# Patient Record
Sex: MALE | Race: WHITE
[De-identification: names, ages, dates, MRNs, and addresses within clinical notes are randomized per-mention and may not be internally consistent; named-entity substitution may affect disease eponyms.]

---

## 2019-08-29 ENCOUNTER — HOSPITAL ENCOUNTER (EMERGENCY)
Dept: HOSPITAL 53 - M ED | Age: 84
Discharge: HOME | End: 2019-08-29
Payer: MEDICARE

## 2019-08-29 VITALS — WEIGHT: 199.96 LBS | BODY MASS INDEX: 27.08 KG/M2 | HEIGHT: 72 IN

## 2019-08-29 VITALS — SYSTOLIC BLOOD PRESSURE: 204 MMHG | DIASTOLIC BLOOD PRESSURE: 102 MMHG

## 2019-08-29 VITALS — SYSTOLIC BLOOD PRESSURE: 198 MMHG | DIASTOLIC BLOOD PRESSURE: 89 MMHG

## 2019-08-29 DIAGNOSIS — I44.4: ICD-10-CM

## 2019-08-29 DIAGNOSIS — Z88.0: ICD-10-CM

## 2019-08-29 DIAGNOSIS — I45.4: ICD-10-CM

## 2019-08-29 DIAGNOSIS — Z79.899: ICD-10-CM

## 2019-08-29 DIAGNOSIS — I16.0: Primary | ICD-10-CM

## 2019-08-29 LAB
APTT BLD: 30 SECONDS (ref 25–38.4)
BASOPHILS # BLD AUTO: 0.1 10^3/UL (ref 0–0.2)
BASOPHILS NFR BLD AUTO: 0.9 % (ref 0–1)
BUN SERPL-MCNC: 15 MG/DL (ref 7–18)
CALCIUM SERPL-MCNC: 9.4 MG/DL (ref 8.8–10.2)
CHLORIDE SERPL-SCNC: 105 MEQ/L (ref 98–107)
CK MB CFR.DF SERPL CALC: 2.63
CK MB SERPL-MCNC: 2 NG/ML (ref ?–3.6)
CK SERPL-CCNC: 76 U/L (ref 39–308)
CO2 SERPL-SCNC: 30 MEQ/L (ref 21–32)
CREAT SERPL-MCNC: 1.16 MG/DL (ref 0.7–1.3)
EOSINOPHIL # BLD AUTO: 0.3 10^3/UL (ref 0–0.5)
EOSINOPHIL NFR BLD AUTO: 3.6 % (ref 0–3)
ETHANOL SERPL-MCNC: < 0.003 % (ref 0–0.01)
GFR SERPL CREATININE-BSD FRML MDRD: > 60 ML/MIN/{1.73_M2} (ref 35–?)
GLUCOSE SERPL-MCNC: 104 MG/DL (ref 70–100)
HCT VFR BLD AUTO: 45.6 % (ref 42–52)
HGB BLD-MCNC: 15.6 G/DL (ref 13.5–17.5)
INR PPP: 0.97
LYMPHOCYTES # BLD AUTO: 1 10^3/UL (ref 1.5–4.5)
LYMPHOCYTES NFR BLD AUTO: 12.2 % (ref 24–44)
MAGNESIUM SERPL-MCNC: 2.2 MG/DL (ref 1.8–2.4)
MCH RBC QN AUTO: 32.6 PG (ref 27–33)
MCHC RBC AUTO-ENTMCNC: 34.2 G/DL (ref 32–36.5)
MCV RBC AUTO: 95.2 FL (ref 80–96)
MONOCYTES # BLD AUTO: 0.8 10^3/UL (ref 0–0.8)
MONOCYTES NFR BLD AUTO: 10.5 % (ref 0–5)
NEUTROPHILS # BLD AUTO: 5.8 10^3/UL (ref 1.8–7.7)
NEUTROPHILS NFR BLD AUTO: 72.2 % (ref 36–66)
NT-PRO BNP: 1298 PG/ML (ref ?–450)
PLATELET # BLD AUTO: 150 10^3/UL (ref 150–450)
POTASSIUM SERPL-SCNC: 4.2 MEQ/L (ref 3.5–5.1)
PROTHROMBIN TIME: 12.6 SECONDS (ref 11.8–14)
RBC # BLD AUTO: 4.79 10^6/UL (ref 4.3–6.1)
SODIUM SERPL-SCNC: 139 MEQ/L (ref 136–145)
T4 FREE SERPL-MCNC: 0.93 NG/DL (ref 0.76–1.46)
TROPONIN I SERPL-MCNC: 0.05 NG/ML (ref ?–0.1)
TSH SERPL DL<=0.005 MIU/L-ACNC: 2.35 UIU/ML (ref 0.36–3.74)
WBC # BLD AUTO: 8 10^3/UL (ref 4–10)

## 2019-08-29 PROCEDURE — 93041 RHYTHM ECG TRACING: CPT

## 2019-08-29 PROCEDURE — 83880 ASSAY OF NATRIURETIC PEPTIDE: CPT

## 2019-08-29 PROCEDURE — 85025 COMPLETE CBC W/AUTO DIFF WBC: CPT

## 2019-08-29 PROCEDURE — 96374 THER/PROPH/DIAG INJ IV PUSH: CPT

## 2019-08-29 PROCEDURE — 84484 ASSAY OF TROPONIN QUANT: CPT

## 2019-08-29 PROCEDURE — 80048 BASIC METABOLIC PNL TOTAL CA: CPT

## 2019-08-29 PROCEDURE — 83735 ASSAY OF MAGNESIUM: CPT

## 2019-08-29 PROCEDURE — 85610 PROTHROMBIN TIME: CPT

## 2019-08-29 PROCEDURE — 93005 ELECTROCARDIOGRAM TRACING: CPT

## 2019-08-29 PROCEDURE — 82553 CREATINE MB FRACTION: CPT

## 2019-08-29 PROCEDURE — 94760 N-INVAS EAR/PLS OXIMETRY 1: CPT

## 2019-08-29 PROCEDURE — 70450 CT HEAD/BRAIN W/O DYE: CPT

## 2019-08-29 PROCEDURE — 84443 ASSAY THYROID STIM HORMONE: CPT

## 2019-08-29 PROCEDURE — 85730 THROMBOPLASTIN TIME PARTIAL: CPT

## 2019-08-29 PROCEDURE — 99285 EMERGENCY DEPT VISIT HI MDM: CPT

## 2019-08-29 PROCEDURE — 81001 URINALYSIS AUTO W/SCOPE: CPT

## 2019-08-29 PROCEDURE — 84439 ASSAY OF FREE THYROXINE: CPT

## 2019-08-29 PROCEDURE — 71045 X-RAY EXAM CHEST 1 VIEW: CPT

## 2019-08-29 PROCEDURE — 82550 ASSAY OF CK (CPK): CPT

## 2019-08-29 NOTE — REP
Chest, single view

 

Indication:  Syncope/near syncope.

 

Comparison:  None

 

Findings:  The cardiomediastinal silhouette is normal in appearance for size.

There is tortuosity of the thoracic aorta.  The pulmonary vascularity is mildly

increased symmetrically.  There is no focal consolidation or suspicious pulmonary

nodule.  The costophrenic angles are sharp. Osseous structures are intact.

 

Impression:  Mild prominence of the pulmonary vasculature.  Heart size normal.

 

 

Electronically Signed by

Geraldine Young MD 08/29/2019 01:28 P

## 2019-08-29 NOTE — REP
CT of the head without contrast

 

Indication:  Syncope.

 

Comparison:  None

 

Technique:  Axial CT of the head was performed without contrast.

 

Findings:  There is no visible soft tissue swelling or calvarial fracture.  There

is no evidence of acute intracranial hemorrhage or extra-axial fluid collection.

Gray-white matter differentiation is maintained. There are dense intracranial

vascular calcifications. There is no mass effect or midline shift.  The basal

cisterns are patent. There is no hydrocephalus.  The visualized paranasal sinuses

and mastoid air cells are clear.

 

Impression:  No acute intracranial abnormality.

 

 

Electronically Signed by

Geraldine Young MD 08/29/2019 01:24 P

## 2019-08-30 NOTE — ECGEPIP
[facility] - ED

                                       

                                       Test Date:    2019

Pat Name:     JESSY CARSON         Department:   

Patient ID:   [id number]                 Room:         -

Gender:       Male                     Technician:   CJ

:          1935               Requested By: REJI MARTINEZ

Order Number: [id number]     Reading MD:   Mo Espinal

                                 Measurements

Intervals                              Axis          

Rate:         88                       P:            13

MI:           178                      QRS:          -67

QRSD:         139                      T:            23

QT:           421                                    

QTc:          511                                    

                           Interpretive Statements

SINUS RHYTHM WITH FREQUENT SUPRAVENTRICULAR PREMATURE COMPLEXES

INTRAVENTRICULAR CONDUCTION DELAY

LEFT ANTERIOR FASCICULAR BLOCK

MINIMAL VOLTAGE CRITERIA FOR LVH, CONSIDER NORMAL VARIANT

NO PRIORS FOR COMPARISON

Electronically Signed on 2019 8:13:02 EDT by Mo Espinal

## 2019-09-13 ENCOUNTER — HOSPITAL ENCOUNTER (OUTPATIENT)
Dept: HOSPITAL 53 - M SFHCPLAZ | Age: 84
End: 2019-09-13
Attending: FAMILY MEDICINE
Payer: MEDICARE

## 2019-09-13 DIAGNOSIS — Z13.220: Primary | ICD-10-CM

## 2019-09-13 DIAGNOSIS — I10: ICD-10-CM

## 2019-09-13 LAB
ALBUMIN SERPL BCG-MCNC: 3.9 GM/DL (ref 3.2–5.2)
ALT SERPL W P-5'-P-CCNC: 25 U/L (ref 12–78)
BILIRUB SERPL-MCNC: 1 MG/DL (ref 0.2–1)
BUN SERPL-MCNC: 15 MG/DL (ref 7–18)
CALCIUM SERPL-MCNC: 9.4 MG/DL (ref 8.8–10.2)
CHLORIDE SERPL-SCNC: 107 MEQ/L (ref 98–107)
CHOLEST SERPL-MCNC: 201 MG/DL (ref ?–200)
CHOLEST/HDLC SERPL: 3.35 {RATIO} (ref ?–5)
CO2 SERPL-SCNC: 27 MEQ/L (ref 21–32)
CREAT SERPL-MCNC: 1.04 MG/DL (ref 0.7–1.3)
CREAT UR-MCNC: 156 MG/DL
GFR SERPL CREATININE-BSD FRML MDRD: > 60 ML/MIN/{1.73_M2} (ref 35–?)
GLUCOSE SERPL-MCNC: 101 MG/DL (ref 70–100)
HDLC SERPL-MCNC: 60 MG/DL (ref 40–?)
LDLC SERPL CALC-MCNC: 104 MG/DL (ref ?–100)
MICROALBUMIN UR-MCNC: 51.9 MG/L
MICROALBUMIN/CREAT UR: 33.2 MCG/MG (ref 0–30)
NONHDLC SERPL-MCNC: 141 MG/DL
POTASSIUM SERPL-SCNC: 4.3 MEQ/L (ref 3.5–5.1)
PROT SERPL-MCNC: 6.7 GM/DL (ref 6.4–8.2)
SODIUM SERPL-SCNC: 141 MEQ/L (ref 136–145)
TRIGL SERPL-MCNC: 187 MG/DL (ref ?–150)

## 2019-10-10 ENCOUNTER — HOSPITAL ENCOUNTER (OUTPATIENT)
Dept: HOSPITAL 53 - M SFHCPLAZ | Age: 84
End: 2019-10-10
Attending: FAMILY MEDICINE
Payer: MEDICARE

## 2019-10-10 DIAGNOSIS — C44.622: Primary | ICD-10-CM

## 2019-10-10 DIAGNOSIS — I10: ICD-10-CM

## 2019-10-10 LAB
BUN SERPL-MCNC: 17 MG/DL (ref 7–18)
CALCIUM SERPL-MCNC: 8.9 MG/DL (ref 8.8–10.2)
CHLORIDE SERPL-SCNC: 105 MEQ/L (ref 98–107)
CO2 SERPL-SCNC: 30 MEQ/L (ref 21–32)
CREAT SERPL-MCNC: 1.06 MG/DL (ref 0.7–1.3)
CREAT UR-MCNC: 154 MG/DL
GFR SERPL CREATININE-BSD FRML MDRD: > 60 ML/MIN/{1.73_M2} (ref 35–?)
GLUCOSE SERPL-MCNC: 103 MG/DL (ref 70–100)
MICROALBUMIN UR-MCNC: 74.9 MG/L
MICROALBUMIN/CREAT UR: 48.6 MCG/MG (ref 0–30)
POTASSIUM SERPL-SCNC: 4.2 MEQ/L (ref 3.5–5.1)
SODIUM SERPL-SCNC: 142 MEQ/L (ref 136–145)

## 2019-10-10 PROCEDURE — 88305 TISSUE EXAM BY PATHOLOGIST: CPT

## 2019-10-10 PROCEDURE — 82043 UR ALBUMIN QUANTITATIVE: CPT

## 2019-10-10 PROCEDURE — 80048 BASIC METABOLIC PNL TOTAL CA: CPT

## 2019-10-10 PROCEDURE — 36415 COLL VENOUS BLD VENIPUNCTURE: CPT

## 2019-11-27 ENCOUNTER — HOSPITAL ENCOUNTER (OUTPATIENT)
Dept: HOSPITAL 53 - M LAB REF | Age: 84
End: 2019-11-27
Attending: DERMATOLOGY
Payer: MEDICARE

## 2019-11-27 DIAGNOSIS — C44.622: Primary | ICD-10-CM

## 2020-02-10 ENCOUNTER — HOSPITAL ENCOUNTER (OUTPATIENT)
Dept: HOSPITAL 53 - M LAB REF | Age: 85
End: 2020-02-10
Attending: DERMATOLOGY
Payer: MEDICARE

## 2020-02-10 DIAGNOSIS — L82.1: Primary | ICD-10-CM

## 2020-06-26 ENCOUNTER — HOSPITAL ENCOUNTER (OUTPATIENT)
Dept: HOSPITAL 53 - M PLALAB | Age: 85
End: 2020-06-26
Attending: FAMILY MEDICINE
Payer: MEDICARE

## 2020-06-26 DIAGNOSIS — I10: Primary | ICD-10-CM

## 2020-06-26 LAB
BUN SERPL-MCNC: 15 MG/DL (ref 7–18)
CALCIUM SERPL-MCNC: 9.2 MG/DL (ref 8.8–10.2)
CHLORIDE SERPL-SCNC: 106 MEQ/L (ref 98–107)
CO2 SERPL-SCNC: 30 MEQ/L (ref 21–32)
CREAT SERPL-MCNC: 1.21 MG/DL (ref 0.7–1.3)
GFR SERPL CREATININE-BSD FRML MDRD: > 60 ML/MIN/{1.73_M2} (ref 35–?)
GLUCOSE SERPL-MCNC: 102 MG/DL (ref 70–100)
POTASSIUM SERPL-SCNC: 4.7 MEQ/L (ref 3.5–5.1)
SODIUM SERPL-SCNC: 141 MEQ/L (ref 136–145)

## 2020-08-03 ENCOUNTER — HOSPITAL ENCOUNTER (OUTPATIENT)
Dept: HOSPITAL 53 - M SFHCPLAZ | Age: 85
End: 2020-08-03
Attending: FAMILY MEDICINE
Payer: MEDICARE

## 2020-08-03 DIAGNOSIS — I10: Primary | ICD-10-CM

## 2020-08-05 ENCOUNTER — HOSPITAL ENCOUNTER (OUTPATIENT)
Dept: HOSPITAL 53 - M SFHCPLAZ | Age: 85
End: 2020-08-05
Attending: FAMILY MEDICINE
Payer: MEDICARE

## 2020-08-05 DIAGNOSIS — I10: Primary | ICD-10-CM

## 2020-08-27 ENCOUNTER — HOSPITAL ENCOUNTER (OUTPATIENT)
Dept: HOSPITAL 53 - M PLALAB | Age: 85
End: 2020-08-27
Attending: FAMILY MEDICINE
Payer: MEDICARE

## 2020-08-27 DIAGNOSIS — I10: Primary | ICD-10-CM

## 2020-08-27 LAB
BUN SERPL-MCNC: 19 MG/DL (ref 7–18)
CALCIUM SERPL-MCNC: 9.4 MG/DL (ref 8.8–10.2)
CHLORIDE SERPL-SCNC: 107 MEQ/L (ref 98–107)
CO2 SERPL-SCNC: 31 MEQ/L (ref 21–32)
CREAT SERPL-MCNC: 1.29 MG/DL (ref 0.7–1.3)
GFR SERPL CREATININE-BSD FRML MDRD: 56.5 ML/MIN/{1.73_M2} (ref 35–?)
GLUCOSE SERPL-MCNC: 118 MG/DL (ref 70–100)
POTASSIUM SERPL-SCNC: 4.3 MEQ/L (ref 3.5–5.1)
SODIUM SERPL-SCNC: 143 MEQ/L (ref 136–145)

## 2020-09-12 LAB
ALBUMIN SERPL BCG-MCNC: 4 GM/DL (ref 3.2–5.2)
BUN SERPL-MCNC: 29 MG/DL (ref 7–18)
CALCIUM SERPL-MCNC: 9.1 MG/DL (ref 8.8–10.2)
CHLORIDE SERPL-SCNC: 106 MEQ/L (ref 98–107)
CO2 SERPL-SCNC: 30 MEQ/L (ref 21–32)
CREAT SERPL-MCNC: 1.62 MG/DL (ref 0.7–1.3)
GFR SERPL CREATININE-BSD FRML MDRD: 43.4 ML/MIN/{1.73_M2} (ref 35–?)
GLUCOSE SERPL-MCNC: 118 MG/DL (ref 70–100)
PHOSPHATE SERPL-MCNC: 3.2 MG/DL (ref 2.5–4.9)
POTASSIUM SERPL-SCNC: 4.2 MEQ/L (ref 3.5–5.1)
SODIUM SERPL-SCNC: 142 MEQ/L (ref 136–145)

## 2020-09-19 LAB
BUN SERPL-MCNC: 22 MG/DL (ref 7–18)
CALCIUM SERPL-MCNC: 8.7 MG/DL (ref 8.8–10.2)
CHLORIDE SERPL-SCNC: 105 MEQ/L (ref 98–107)
CO2 SERPL-SCNC: 33 MEQ/L (ref 21–32)
CREAT SERPL-MCNC: 1.44 MG/DL (ref 0.7–1.3)
GFR SERPL CREATININE-BSD FRML MDRD: 49.8 ML/MIN/{1.73_M2} (ref 35–?)
GLUCOSE SERPL-MCNC: 120 MG/DL (ref 70–100)
POTASSIUM SERPL-SCNC: 4.7 MEQ/L (ref 3.5–5.1)
SODIUM SERPL-SCNC: 142 MEQ/L (ref 136–145)

## 2021-02-02 ENCOUNTER — HOSPITAL ENCOUNTER (OUTPATIENT)
Dept: HOSPITAL 53 - M PLALAB | Age: 86
End: 2021-02-02
Attending: FAMILY MEDICINE
Payer: MEDICARE

## 2021-02-02 DIAGNOSIS — I10: Primary | ICD-10-CM

## 2021-02-02 LAB
BUN SERPL-MCNC: 24 MG/DL (ref 7–18)
CALCIUM SERPL-MCNC: 9.6 MG/DL (ref 8.8–10.2)
CHLORIDE SERPL-SCNC: 105 MEQ/L (ref 98–107)
CO2 SERPL-SCNC: 29 MEQ/L (ref 21–32)
CREAT SERPL-MCNC: 1.39 MG/DL (ref 0.7–1.3)
GFR SERPL CREATININE-BSD FRML MDRD: 51.8 ML/MIN/{1.73_M2} (ref 35–?)
GLUCOSE SERPL-MCNC: 118 MG/DL (ref 70–100)
POTASSIUM SERPL-SCNC: 4.3 MEQ/L (ref 3.5–5.1)
SODIUM SERPL-SCNC: 141 MEQ/L (ref 136–145)

## 2021-07-22 ENCOUNTER — HOSPITAL ENCOUNTER (OUTPATIENT)
Dept: HOSPITAL 53 - M PLALAB | Age: 86
End: 2021-07-22
Attending: FAMILY MEDICINE
Payer: MEDICARE

## 2021-07-22 DIAGNOSIS — N18.31: ICD-10-CM

## 2021-07-22 DIAGNOSIS — R73.01: Primary | ICD-10-CM

## 2021-07-22 LAB
BUN SERPL-MCNC: 16 MG/DL (ref 7–18)
CALCIUM SERPL-MCNC: 8.9 MG/DL (ref 8.8–10.2)
CHLORIDE SERPL-SCNC: 106 MEQ/L (ref 98–107)
CO2 SERPL-SCNC: 31 MEQ/L (ref 21–32)
CREAT SERPL-MCNC: 1.16 MG/DL (ref 0.7–1.3)
EST. AVERAGE GLUCOSE BLD GHB EST-MCNC: 105 MG/DL (ref 60–110)
GFR SERPL CREATININE-BSD FRML MDRD: > 60 ML/MIN/{1.73_M2} (ref 35–?)
GLUCOSE SERPL-MCNC: 85 MG/DL (ref 70–100)
HCT VFR BLD AUTO: 45.1 % (ref 42–52)
HGB BLD-MCNC: 15.1 G/DL (ref 13.5–17.5)
MCH RBC QN AUTO: 31.5 PG (ref 27–33)
MCHC RBC AUTO-ENTMCNC: 33.5 G/DL (ref 32–36.5)
MCV RBC AUTO: 94 FL (ref 80–96)
PLATELET # BLD AUTO: 160 10^3/UL (ref 150–450)
POTASSIUM SERPL-SCNC: 4.2 MEQ/L (ref 3.5–5.1)
RBC # BLD AUTO: 4.8 10^6/UL (ref 4.3–6.1)
SODIUM SERPL-SCNC: 141 MEQ/L (ref 136–145)
WBC # BLD AUTO: 7.7 10^3/UL (ref 4–10)

## 2022-05-06 ENCOUNTER — HOSPITAL ENCOUNTER (OUTPATIENT)
Dept: HOSPITAL 53 - M PLALAB | Age: 87
End: 2022-05-06
Attending: FAMILY MEDICINE
Payer: MEDICARE

## 2022-05-06 DIAGNOSIS — R23.3: Primary | ICD-10-CM

## 2022-05-06 DIAGNOSIS — K80.50: ICD-10-CM

## 2022-05-06 DIAGNOSIS — I50.42: ICD-10-CM

## 2022-05-06 DIAGNOSIS — I11.0: ICD-10-CM

## 2022-05-06 LAB
ALBUMIN SERPL BCG-MCNC: 2.7 GM/DL (ref 3.2–5.2)
ALT SERPL W P-5'-P-CCNC: 73 U/L (ref 12–78)
BILIRUB SERPL-MCNC: 3.7 MG/DL (ref 0.2–1)
BUN SERPL-MCNC: 20 MG/DL (ref 7–18)
CALCIUM SERPL-MCNC: 9.1 MG/DL (ref 8.8–10.2)
CHLORIDE SERPL-SCNC: 105 MEQ/L (ref 98–107)
CO2 SERPL-SCNC: 31 MEQ/L (ref 21–32)
CREAT SERPL-MCNC: 1 MG/DL (ref 0.7–1.3)
GFR SERPL CREATININE-BSD FRML MDRD: > 60 ML/MIN/{1.73_M2} (ref 35–?)
GLUCOSE SERPL-MCNC: 113 MG/DL (ref 70–100)
HCT VFR BLD AUTO: 34.5 % (ref 42–52)
HGB BLD-MCNC: 11.4 G/DL (ref 13.5–17.5)
MCH RBC QN AUTO: 31.3 PG (ref 27–33)
MCHC RBC AUTO-ENTMCNC: 33 G/DL (ref 32–36.5)
MCV RBC AUTO: 94.8 FL (ref 80–96)
NT-PRO BNP: 4100 PG/ML (ref ?–450)
PLATELET # BLD AUTO: 240 10^3/UL (ref 150–450)
POTASSIUM SERPL-SCNC: 4.5 MEQ/L (ref 3.5–5.1)
PROT SERPL-MCNC: 6.1 GM/DL (ref 6.4–8.2)
RBC # BLD AUTO: 3.64 10^6/UL (ref 4.3–6.1)
SODIUM SERPL-SCNC: 141 MEQ/L (ref 136–145)
WBC # BLD AUTO: 7.3 10^3/UL (ref 4–10)

## 2022-06-29 ENCOUNTER — HOSPITAL ENCOUNTER (INPATIENT)
Dept: HOSPITAL 53 - M ED | Age: 87
LOS: 6 days | DRG: 283 | End: 2022-07-05
Attending: FAMILY MEDICINE | Admitting: FAMILY MEDICINE
Payer: MEDICARE

## 2022-06-29 VITALS — HEIGHT: 72 IN | WEIGHT: 166.89 LBS | BODY MASS INDEX: 22.6 KG/M2

## 2022-06-29 DIAGNOSIS — I13.2: ICD-10-CM

## 2022-06-29 DIAGNOSIS — Z95.5: ICD-10-CM

## 2022-06-29 DIAGNOSIS — Z90.49: ICD-10-CM

## 2022-06-29 DIAGNOSIS — Z66: ICD-10-CM

## 2022-06-29 DIAGNOSIS — R74.01: ICD-10-CM

## 2022-06-29 DIAGNOSIS — I95.9: ICD-10-CM

## 2022-06-29 DIAGNOSIS — B96.20: ICD-10-CM

## 2022-06-29 DIAGNOSIS — D72.829: ICD-10-CM

## 2022-06-29 DIAGNOSIS — B95.2: ICD-10-CM

## 2022-06-29 DIAGNOSIS — E87.2: ICD-10-CM

## 2022-06-29 DIAGNOSIS — Z98.41: ICD-10-CM

## 2022-06-29 DIAGNOSIS — Z87.891: ICD-10-CM

## 2022-06-29 DIAGNOSIS — I48.91: ICD-10-CM

## 2022-06-29 DIAGNOSIS — I50.43: ICD-10-CM

## 2022-06-29 DIAGNOSIS — N18.6: ICD-10-CM

## 2022-06-29 DIAGNOSIS — D69.6: ICD-10-CM

## 2022-06-29 DIAGNOSIS — N17.9: ICD-10-CM

## 2022-06-29 DIAGNOSIS — I21.4: ICD-10-CM

## 2022-06-29 DIAGNOSIS — Z96.0: ICD-10-CM

## 2022-06-29 DIAGNOSIS — T82.7XXA: Primary | ICD-10-CM

## 2022-06-29 DIAGNOSIS — Z88.0: ICD-10-CM

## 2022-06-29 DIAGNOSIS — R00.0: ICD-10-CM

## 2022-06-29 DIAGNOSIS — Z79.899: ICD-10-CM

## 2022-06-29 DIAGNOSIS — E11.22: ICD-10-CM

## 2022-06-29 DIAGNOSIS — Z98.42: ICD-10-CM

## 2022-06-29 DIAGNOSIS — R65.20: ICD-10-CM

## 2022-06-29 DIAGNOSIS — A41.9: ICD-10-CM

## 2022-06-29 DIAGNOSIS — Z20.822: ICD-10-CM

## 2022-06-29 DIAGNOSIS — I27.20: ICD-10-CM

## 2022-06-29 DIAGNOSIS — N39.0: ICD-10-CM

## 2022-06-29 DIAGNOSIS — D63.1: ICD-10-CM

## 2022-06-29 DIAGNOSIS — Z99.2: ICD-10-CM

## 2022-06-29 LAB
BASOPHILS # BLD AUTO: 0.1 10^3/UL (ref 0–0.2)
BASOPHILS NFR BLD AUTO: 0.3 % (ref 0–1)
BUN SERPL-MCNC: 29 MG/DL (ref 7–18)
CALCIUM SERPL-MCNC: 8.1 MG/DL (ref 8.8–10.2)
CHLORIDE SERPL-SCNC: 104 MEQ/L (ref 98–107)
CK MB CFR.DF SERPL CALC: 11.2
CK MB CFR.DF SERPL CALC: 15.65
CK MB SERPL-MCNC: 5.6 NG/ML (ref ?–3.6)
CK MB SERPL-MCNC: 7.2 NG/ML (ref ?–3.6)
CK SERPL-CCNC: 46 U/L (ref 39–308)
CK SERPL-CCNC: 50 U/L (ref 39–308)
CO2 SERPL-SCNC: 26 MEQ/L (ref 21–32)
CREAT SERPL-MCNC: 3.86 MG/DL (ref 0.7–1.3)
EOSINOPHIL # BLD AUTO: 0 10^3/UL (ref 0–0.5)
EOSINOPHIL NFR BLD AUTO: 0.1 % (ref 0–3)
GFR SERPL CREATININE-BSD FRML MDRD: 15.9 ML/MIN/{1.73_M2} (ref 35–?)
GLUCOSE SERPL-MCNC: 78 MG/DL (ref 70–100)
HCT VFR BLD AUTO: 32.5 % (ref 42–52)
HGB BLD-MCNC: 10.9 G/DL (ref 13.5–17.5)
INR PPP: 1.1
LYMPHOCYTES # BLD AUTO: 0.6 10^3/UL (ref 1.5–5)
LYMPHOCYTES NFR BLD AUTO: 2.2 % (ref 24–44)
MAGNESIUM SERPL-MCNC: 2.1 MG/DL (ref 1.8–2.4)
MCH RBC QN AUTO: 32.7 PG (ref 27–33)
MCHC RBC AUTO-ENTMCNC: 33.5 G/DL (ref 32–36.5)
MCV RBC AUTO: 97.6 FL (ref 80–96)
MONOCYTES # BLD AUTO: 0.5 10^3/UL (ref 0–0.8)
MONOCYTES NFR BLD AUTO: 1.7 % (ref 2–8)
NEUTROPHILS # BLD AUTO: 26.9 10^3/UL (ref 1.5–8.5)
NEUTROPHILS NFR BLD AUTO: 94 % (ref 36–66)
PLATELET # BLD AUTO: 27 10^3/UL (ref 150–450)
POTASSIUM SERPL-SCNC: 4 MEQ/L (ref 3.5–5.1)
PROTHROMBIN TIME: 14.6 SECONDS (ref 12.7–14.5)
RBC # BLD AUTO: 3.33 10^6/UL (ref 4.3–6.1)
RSV RNA NPH QL NAA+PROBE: NEGATIVE
SODIUM SERPL-SCNC: 141 MEQ/L (ref 136–145)
T4 FREE SERPL-MCNC: 1.09 NG/DL (ref 0.76–1.46)
TSH SERPL DL<=0.005 MIU/L-ACNC: 4.81 UIU/ML (ref 0.36–3.74)
WBC # BLD AUTO: 28.6 10^3/UL (ref 4–10)

## 2022-06-30 VITALS — DIASTOLIC BLOOD PRESSURE: 58 MMHG | SYSTOLIC BLOOD PRESSURE: 92 MMHG

## 2022-06-30 VITALS — DIASTOLIC BLOOD PRESSURE: 58 MMHG | SYSTOLIC BLOOD PRESSURE: 105 MMHG

## 2022-06-30 VITALS — SYSTOLIC BLOOD PRESSURE: 91 MMHG | DIASTOLIC BLOOD PRESSURE: 57 MMHG

## 2022-06-30 VITALS — SYSTOLIC BLOOD PRESSURE: 100 MMHG | DIASTOLIC BLOOD PRESSURE: 60 MMHG

## 2022-06-30 VITALS — SYSTOLIC BLOOD PRESSURE: 92 MMHG | DIASTOLIC BLOOD PRESSURE: 52 MMHG

## 2022-06-30 VITALS — DIASTOLIC BLOOD PRESSURE: 60 MMHG | SYSTOLIC BLOOD PRESSURE: 100 MMHG

## 2022-06-30 LAB
ALBUMIN SERPL BCG-MCNC: 2 GM/DL (ref 3.2–5.2)
ALT SERPL W P-5'-P-CCNC: 46 U/L (ref 12–78)
AMYLASE SERPL-CCNC: 80 U/L (ref 25–115)
APTT BLD: 28.6 SECONDS (ref 25.9–37)
BACTERIA URNS QL MICRO: (no result)
BASOPHILS # BLD AUTO: 0.1 10^3/UL (ref 0–0.2)
BASOPHILS NFR BLD AUTO: 0.2 % (ref 0–1)
BILIRUB SERPL-MCNC: 2.3 MG/DL (ref 0.2–1)
BILIRUB UR QL STRIP: NEGATIVE
BUN SERPL-MCNC: 33 MG/DL (ref 7–18)
CALCIUM SERPL-MCNC: 7.7 MG/DL (ref 8.8–10.2)
CHLORIDE SERPL-SCNC: 105 MEQ/L (ref 98–107)
CK MB CFR.DF SERPL CALC: 13.27
CK MB CFR.DF SERPL CALC: 14.73
CK MB CFR.DF SERPL CALC: 18.84
CK MB CFR.DF SERPL CALC: 21.56
CK MB SERPL-MCNC: 13 NG/ML (ref ?–3.6)
CK MB SERPL-MCNC: 16.6 NG/ML (ref ?–3.6)
CK MB SERPL-MCNC: 6.5 NG/ML (ref ?–3.6)
CK MB SERPL-MCNC: 8.1 NG/ML (ref ?–3.6)
CK SERPL-CCNC: 49 U/L (ref 39–308)
CK SERPL-CCNC: 55 U/L (ref 39–308)
CK SERPL-CCNC: 69 U/L (ref 39–308)
CK SERPL-CCNC: 77 U/L (ref 39–308)
CO2 SERPL-SCNC: 25 MEQ/L (ref 21–32)
CREAT SERPL-MCNC: 4.27 MG/DL (ref 0.7–1.3)
EOSINOPHIL # BLD AUTO: 0 10^3/UL (ref 0–0.5)
EOSINOPHIL NFR BLD AUTO: 0 % (ref 0–3)
GFR SERPL CREATININE-BSD FRML MDRD: 14.1 ML/MIN/{1.73_M2} (ref 35–?)
GLUCOSE SERPL-MCNC: 85 MG/DL (ref 70–100)
GLUCOSE UR STRIP-MCNC: NEGATIVE MG/DL
HCT VFR BLD AUTO: 28.9 % (ref 42–52)
HGB BLD-MCNC: 9.5 G/DL (ref 13.5–17.5)
HYALINE CASTS URNS QL MICRO: (no result) /LPF (ref 0–1)
INR PPP: 1.15
KETONES UR QL STRIP: NEGATIVE MG/DL
LIPASE SERPL-CCNC: 158 U/L (ref 73–393)
LYMPHOCYTES # BLD AUTO: 0.6 10^3/UL (ref 1.5–5)
LYMPHOCYTES NFR BLD AUTO: 2.2 % (ref 24–44)
MAGNESIUM SERPL-MCNC: 2 MG/DL (ref 1.8–2.4)
MCH RBC QN AUTO: 32.2 PG (ref 27–33)
MCHC RBC AUTO-ENTMCNC: 32.9 G/DL (ref 32–36.5)
MCV RBC AUTO: 98 FL (ref 80–96)
MONOCYTES # BLD AUTO: 0.5 10^3/UL (ref 0–0.8)
MONOCYTES NFR BLD AUTO: 1.8 % (ref 2–8)
MUCOUS THREADS URNS QL MICRO: (no result)
NEUTROPHILS # BLD AUTO: 24.2 10^3/UL (ref 1.5–8.5)
NEUTROPHILS NFR BLD AUTO: 94.3 % (ref 36–66)
PHOSPHATE SERPL-MCNC: 4 MG/DL (ref 2.5–4.9)
PLATELET # BLD AUTO: 27 10^3/UL (ref 150–450)
POTASSIUM SERPL-SCNC: 4.4 MEQ/L (ref 3.5–5.1)
PROT SERPL-MCNC: 4.7 GM/DL (ref 6.4–8.2)
PROTHROMBIN TIME: 15.1 SECONDS (ref 12.7–14.5)
RBC # BLD AUTO: 2.95 10^6/UL (ref 4.3–6.1)
RBC #/AREA URNS HPF: (no result) /HPF (ref 0–3)
SODIUM SERPL-SCNC: 141 MEQ/L (ref 136–145)
SQUAMOUS URNS QL MICRO: (no result) /HPF
UROBILINOGEN UR QL STRIP: NORMAL MG/DL
WBC # BLD AUTO: 25.6 10^3/UL (ref 4–10)

## 2022-06-30 RX ADMIN — SEVELAMER CARBONATE SCH MG: 800 TABLET, FILM COATED ORAL at 18:00

## 2022-06-30 RX ADMIN — CEFTRIAXONE SCH MLS/HR: 1 INJECTION, POWDER, FOR SOLUTION INTRAMUSCULAR; INTRAVENOUS at 02:00

## 2022-06-30 RX ADMIN — DIGOXIN SCH MG: 0.25 INJECTION INTRAMUSCULAR; INTRAVENOUS at 12:21

## 2022-06-30 RX ADMIN — MULTIPLE VITAMINS W/ MINERALS TAB SCH TAB: TAB at 08:58

## 2022-06-30 RX ADMIN — PANTOPRAZOLE SODIUM SCH MG: 20 TABLET, DELAYED RELEASE ORAL at 09:00

## 2022-06-30 RX ADMIN — SEVELAMER CARBONATE SCH MG: 800 TABLET, FILM COATED ORAL at 08:58

## 2022-06-30 RX ADMIN — DIGOXIN SCH MG: 0.25 INJECTION INTRAMUSCULAR; INTRAVENOUS at 04:32

## 2022-06-30 RX ADMIN — OMEGA-3 FATTY ACIDS CAP 1000 MG SCH CAP: 1000 CAP at 08:58

## 2022-06-30 RX ADMIN — DIGOXIN SCH MG: 0.25 INJECTION INTRAMUSCULAR; INTRAVENOUS at 08:58

## 2022-06-30 RX ADMIN — SEVELAMER CARBONATE SCH MG: 800 TABLET, FILM COATED ORAL at 12:21

## 2022-07-01 VITALS — SYSTOLIC BLOOD PRESSURE: 119 MMHG | DIASTOLIC BLOOD PRESSURE: 65 MMHG

## 2022-07-01 VITALS — DIASTOLIC BLOOD PRESSURE: 61 MMHG | SYSTOLIC BLOOD PRESSURE: 99 MMHG

## 2022-07-01 VITALS — SYSTOLIC BLOOD PRESSURE: 99 MMHG | DIASTOLIC BLOOD PRESSURE: 61 MMHG

## 2022-07-01 VITALS — SYSTOLIC BLOOD PRESSURE: 116 MMHG | DIASTOLIC BLOOD PRESSURE: 66 MMHG

## 2022-07-01 VITALS — DIASTOLIC BLOOD PRESSURE: 66 MMHG | SYSTOLIC BLOOD PRESSURE: 112 MMHG

## 2022-07-01 VITALS — DIASTOLIC BLOOD PRESSURE: 67 MMHG | SYSTOLIC BLOOD PRESSURE: 115 MMHG

## 2022-07-01 VITALS — DIASTOLIC BLOOD PRESSURE: 72 MMHG | SYSTOLIC BLOOD PRESSURE: 118 MMHG

## 2022-07-01 LAB
ALBUMIN SERPL BCG-MCNC: 1.9 GM/DL (ref 3.2–5.2)
ALT SERPL W P-5'-P-CCNC: 39 U/L (ref 12–78)
BASOPHILS # BLD AUTO: 0 10^3/UL (ref 0–0.2)
BASOPHILS NFR BLD AUTO: 0.2 % (ref 0–1)
BILIRUB SERPL-MCNC: 1.3 MG/DL (ref 0.2–1)
BUN SERPL-MCNC: 43 MG/DL (ref 7–18)
CALCIUM SERPL-MCNC: 7.9 MG/DL (ref 8.8–10.2)
CHLORIDE SERPL-SCNC: 107 MEQ/L (ref 98–107)
CK MB CFR.DF SERPL CALC: 17.19
CK MB SERPL-MCNC: 11 NG/ML (ref ?–3.6)
CK SERPL-CCNC: 64 U/L (ref 39–308)
CO2 SERPL-SCNC: 22 MEQ/L (ref 21–32)
CREAT SERPL-MCNC: 5.01 MG/DL (ref 0.7–1.3)
EOSINOPHIL # BLD AUTO: 0.1 10^3/UL (ref 0–0.5)
EOSINOPHIL NFR BLD AUTO: 0.3 % (ref 0–3)
GFR SERPL CREATININE-BSD FRML MDRD: 11.7 ML/MIN/{1.73_M2} (ref 35–?)
GLUCOSE SERPL-MCNC: 89 MG/DL (ref 70–100)
HCT VFR BLD AUTO: 31.5 % (ref 42–52)
HGB BLD-MCNC: 10.2 G/DL (ref 13.5–17.5)
LYMPHOCYTES # BLD AUTO: 0.6 10^3/UL (ref 1.5–5)
LYMPHOCYTES NFR BLD AUTO: 3.2 % (ref 24–44)
MCH RBC QN AUTO: 32.3 PG (ref 27–33)
MCHC RBC AUTO-ENTMCNC: 32.4 G/DL (ref 32–36.5)
MCV RBC AUTO: 99.7 FL (ref 80–96)
MONOCYTES # BLD AUTO: 0.6 10^3/UL (ref 0–0.8)
MONOCYTES NFR BLD AUTO: 2.8 % (ref 2–8)
NEUTROPHILS # BLD AUTO: 18.2 10^3/UL (ref 1.5–8.5)
NEUTROPHILS NFR BLD AUTO: 91.7 % (ref 36–66)
PLATELET # BLD AUTO: 50 10^3/UL (ref 150–450)
POTASSIUM SERPL-SCNC: 4.7 MEQ/L (ref 3.5–5.1)
PROT SERPL-MCNC: 4.9 GM/DL (ref 6.4–8.2)
RBC # BLD AUTO: 3.16 10^6/UL (ref 4.3–6.1)
SODIUM SERPL-SCNC: 140 MEQ/L (ref 136–145)
WBC # BLD AUTO: 19.9 10^3/UL (ref 4–10)

## 2022-07-01 RX ADMIN — SEVELAMER CARBONATE SCH MG: 800 TABLET, FILM COATED ORAL at 17:52

## 2022-07-01 RX ADMIN — PANTOPRAZOLE SODIUM SCH MG: 20 TABLET, DELAYED RELEASE ORAL at 09:19

## 2022-07-01 RX ADMIN — MULTIPLE VITAMINS W/ MINERALS TAB SCH TAB: TAB at 09:18

## 2022-07-01 RX ADMIN — SEVELAMER CARBONATE SCH MG: 800 TABLET, FILM COATED ORAL at 09:19

## 2022-07-01 RX ADMIN — SEVELAMER CARBONATE SCH MG: 800 TABLET, FILM COATED ORAL at 12:29

## 2022-07-01 RX ADMIN — CEFTRIAXONE SCH MLS/HR: 1 INJECTION, POWDER, FOR SOLUTION INTRAMUSCULAR; INTRAVENOUS at 01:24

## 2022-07-01 RX ADMIN — OMEGA-3 FATTY ACIDS CAP 1000 MG SCH CAP: 1000 CAP at 09:18

## 2022-07-02 VITALS — SYSTOLIC BLOOD PRESSURE: 111 MMHG | DIASTOLIC BLOOD PRESSURE: 70 MMHG

## 2022-07-02 VITALS — DIASTOLIC BLOOD PRESSURE: 71 MMHG | SYSTOLIC BLOOD PRESSURE: 117 MMHG

## 2022-07-02 VITALS — DIASTOLIC BLOOD PRESSURE: 66 MMHG | SYSTOLIC BLOOD PRESSURE: 105 MMHG

## 2022-07-02 VITALS — DIASTOLIC BLOOD PRESSURE: 70 MMHG | SYSTOLIC BLOOD PRESSURE: 121 MMHG

## 2022-07-02 VITALS — DIASTOLIC BLOOD PRESSURE: 65 MMHG | SYSTOLIC BLOOD PRESSURE: 97 MMHG

## 2022-07-02 VITALS — DIASTOLIC BLOOD PRESSURE: 57 MMHG | SYSTOLIC BLOOD PRESSURE: 112 MMHG

## 2022-07-02 LAB
ALBUMIN SERPL BCG-MCNC: 1.8 GM/DL (ref 3.2–5.2)
ALT SERPL W P-5'-P-CCNC: 33 U/L (ref 12–78)
BASOPHILS # BLD AUTO: 0 10^3/UL (ref 0–0.2)
BASOPHILS NFR BLD AUTO: 0.2 % (ref 0–1)
BILIRUB SERPL-MCNC: 1.3 MG/DL (ref 0.2–1)
BUN SERPL-MCNC: 50 MG/DL (ref 7–18)
CALCIUM SERPL-MCNC: 7.6 MG/DL (ref 8.8–10.2)
CHLORIDE SERPL-SCNC: 108 MEQ/L (ref 98–107)
CK MB CFR.DF SERPL CALC: 22.42
CK MB SERPL-MCNC: 7.4 NG/ML (ref ?–3.6)
CK SERPL-CCNC: 33 U/L (ref 39–308)
CO2 SERPL-SCNC: 24 MEQ/L (ref 21–32)
CREAT SERPL-MCNC: 5.18 MG/DL (ref 0.7–1.3)
CRP SERPL-MCNC: 15.8 MG/DL (ref 0–0.3)
EOSINOPHIL # BLD AUTO: 0.1 10^3/UL (ref 0–0.5)
EOSINOPHIL NFR BLD AUTO: 0.4 % (ref 0–3)
ERYTHROCYTE [SEDIMENTATION RATE] IN BLOOD BY WESTERGREN METHOD: 24 MM/HR (ref 0–20)
GFR SERPL CREATININE-BSD FRML MDRD: 11.3 ML/MIN/{1.73_M2} (ref 35–?)
GLUCOSE SERPL-MCNC: 93 MG/DL (ref 70–100)
HCT VFR BLD AUTO: 31.1 % (ref 42–52)
HGB BLD-MCNC: 9.9 G/DL (ref 13.5–17.5)
LYMPHOCYTES # BLD AUTO: 0.8 10^3/UL (ref 1.5–5)
LYMPHOCYTES NFR BLD AUTO: 4 % (ref 24–44)
MCH RBC QN AUTO: 30.8 PG (ref 27–33)
MCHC RBC AUTO-ENTMCNC: 31.8 G/DL (ref 32–36.5)
MCV RBC AUTO: 96.9 FL (ref 80–96)
MONOCYTES # BLD AUTO: 0.8 10^3/UL (ref 0–0.8)
MONOCYTES NFR BLD AUTO: 4.1 % (ref 2–8)
NEUTROPHILS # BLD AUTO: 17 10^3/UL (ref 1.5–8.5)
NEUTROPHILS NFR BLD AUTO: 89.2 % (ref 36–66)
PHOSPHATE SERPL-MCNC: 4.1 MG/DL (ref 2.5–4.9)
PLATELET # BLD AUTO: 45 10^3/UL (ref 150–450)
POTASSIUM SERPL-SCNC: 4.1 MEQ/L (ref 3.5–5.1)
PROT SERPL-MCNC: 4.4 GM/DL (ref 6.4–8.2)
RBC # BLD AUTO: 3.21 10^6/UL (ref 4.3–6.1)
SODIUM SERPL-SCNC: 143 MEQ/L (ref 136–145)
VANCOMYCIN SERPL-MCNC: 10.7 UG/ML
WBC # BLD AUTO: 19 10^3/UL (ref 4–10)

## 2022-07-02 PROCEDURE — 5A1D70Z PERFORMANCE OF URINARY FILTRATION, INTERMITTENT, LESS THAN 6 HOURS PER DAY: ICD-10-PCS | Performed by: INTERNAL MEDICINE

## 2022-07-02 RX ADMIN — Medication SCH: at 16:00

## 2022-07-02 RX ADMIN — MULTIPLE VITAMINS W/ MINERALS TAB SCH TAB: TAB at 06:30

## 2022-07-02 RX ADMIN — PANTOPRAZOLE SODIUM SCH MG: 20 TABLET, DELAYED RELEASE ORAL at 06:30

## 2022-07-02 RX ADMIN — DEXTROSE MONOHYDRATE SCH MG: 50 INJECTION, SOLUTION INTRAVENOUS at 22:14

## 2022-07-02 RX ADMIN — OMEGA-3 FATTY ACIDS CAP 1000 MG SCH CAP: 1000 CAP at 06:30

## 2022-07-02 RX ADMIN — SEVELAMER CARBONATE SCH MG: 800 TABLET, FILM COATED ORAL at 12:30

## 2022-07-02 RX ADMIN — SEVELAMER CARBONATE SCH MG: 800 TABLET, FILM COATED ORAL at 08:08

## 2022-07-02 RX ADMIN — DEXTROSE MONOHYDRATE SCH MG: 50 INJECTION, SOLUTION INTRAVENOUS at 16:26

## 2022-07-02 RX ADMIN — CEFTRIAXONE SCH MLS/HR: 1 INJECTION, POWDER, FOR SOLUTION INTRAMUSCULAR; INTRAVENOUS at 01:19

## 2022-07-02 RX ADMIN — SEVELAMER CARBONATE SCH MG: 800 TABLET, FILM COATED ORAL at 17:20

## 2022-07-03 VITALS — SYSTOLIC BLOOD PRESSURE: 112 MMHG | DIASTOLIC BLOOD PRESSURE: 61 MMHG

## 2022-07-03 VITALS — DIASTOLIC BLOOD PRESSURE: 66 MMHG | SYSTOLIC BLOOD PRESSURE: 113 MMHG

## 2022-07-03 VITALS — DIASTOLIC BLOOD PRESSURE: 66 MMHG | SYSTOLIC BLOOD PRESSURE: 111 MMHG

## 2022-07-03 VITALS — DIASTOLIC BLOOD PRESSURE: 64 MMHG | SYSTOLIC BLOOD PRESSURE: 108 MMHG

## 2022-07-03 VITALS — SYSTOLIC BLOOD PRESSURE: 107 MMHG | DIASTOLIC BLOOD PRESSURE: 65 MMHG

## 2022-07-03 VITALS — DIASTOLIC BLOOD PRESSURE: 66 MMHG | SYSTOLIC BLOOD PRESSURE: 114 MMHG

## 2022-07-03 LAB
ALBUMIN SERPL BCG-MCNC: 1.8 GM/DL (ref 3.2–5.2)
ALT SERPL W P-5'-P-CCNC: 28 U/L (ref 12–78)
BILIRUB SERPL-MCNC: 1.4 MG/DL (ref 0.2–1)
BUN SERPL-MCNC: 29 MG/DL (ref 7–18)
CALCIUM SERPL-MCNC: 7.9 MG/DL (ref 8.8–10.2)
CHLORIDE SERPL-SCNC: 107 MEQ/L (ref 98–107)
CO2 SERPL-SCNC: 25 MEQ/L (ref 21–32)
CREAT SERPL-MCNC: 3.43 MG/DL (ref 0.7–1.3)
GFR SERPL CREATININE-BSD FRML MDRD: 18.2 ML/MIN/{1.73_M2} (ref 35–?)
GLUCOSE SERPL-MCNC: 105 MG/DL (ref 70–100)
POTASSIUM SERPL-SCNC: 3.8 MEQ/L (ref 3.5–5.1)
PROT SERPL-MCNC: 4.8 GM/DL (ref 6.4–8.2)
SODIUM SERPL-SCNC: 141 MEQ/L (ref 136–145)

## 2022-07-03 PROCEDURE — 05PYX3Z REMOVAL OF INFUSION DEVICE FROM UPPER VEIN, EXTERNAL APPROACH: ICD-10-PCS | Performed by: INTERNAL MEDICINE

## 2022-07-03 RX ADMIN — Medication SCH: at 16:00

## 2022-07-03 RX ADMIN — DEXTROSE MONOHYDRATE SCH MG: 50 INJECTION, SOLUTION INTRAVENOUS at 06:08

## 2022-07-03 RX ADMIN — SEVELAMER CARBONATE SCH MG: 800 TABLET, FILM COATED ORAL at 09:01

## 2022-07-03 RX ADMIN — MULTIPLE VITAMINS W/ MINERALS TAB SCH TAB: TAB at 09:01

## 2022-07-03 RX ADMIN — SEVELAMER CARBONATE SCH MG: 800 TABLET, FILM COATED ORAL at 13:59

## 2022-07-03 RX ADMIN — SEVELAMER CARBONATE SCH MG: 800 TABLET, FILM COATED ORAL at 17:44

## 2022-07-03 RX ADMIN — CEFTRIAXONE SCH MLS/HR: 1 INJECTION, POWDER, FOR SOLUTION INTRAMUSCULAR; INTRAVENOUS at 01:02

## 2022-07-03 RX ADMIN — PANTOPRAZOLE SODIUM SCH MG: 20 TABLET, DELAYED RELEASE ORAL at 09:01

## 2022-07-03 RX ADMIN — DEXTROSE MONOHYDRATE SCH MG: 50 INJECTION, SOLUTION INTRAVENOUS at 23:19

## 2022-07-03 RX ADMIN — DEXTROSE MONOHYDRATE SCH MG: 50 INJECTION, SOLUTION INTRAVENOUS at 15:59

## 2022-07-03 RX ADMIN — OMEGA-3 FATTY ACIDS CAP 1000 MG SCH CAP: 1000 CAP at 09:01

## 2022-07-04 VITALS — DIASTOLIC BLOOD PRESSURE: 61 MMHG | SYSTOLIC BLOOD PRESSURE: 105 MMHG

## 2022-07-04 VITALS — SYSTOLIC BLOOD PRESSURE: 103 MMHG | DIASTOLIC BLOOD PRESSURE: 61 MMHG

## 2022-07-04 VITALS — DIASTOLIC BLOOD PRESSURE: 62 MMHG | SYSTOLIC BLOOD PRESSURE: 107 MMHG

## 2022-07-04 VITALS — DIASTOLIC BLOOD PRESSURE: 50 MMHG | SYSTOLIC BLOOD PRESSURE: 101 MMHG

## 2022-07-04 VITALS — SYSTOLIC BLOOD PRESSURE: 92 MMHG | DIASTOLIC BLOOD PRESSURE: 58 MMHG

## 2022-07-04 VITALS — DIASTOLIC BLOOD PRESSURE: 45 MMHG | SYSTOLIC BLOOD PRESSURE: 95 MMHG

## 2022-07-04 LAB
ALBUMIN SERPL BCG-MCNC: 1.9 GM/DL (ref 3.2–5.2)
ALT SERPL W P-5'-P-CCNC: 30 U/L (ref 12–78)
BILIRUB SERPL-MCNC: 1.7 MG/DL (ref 0.2–1)
BUN SERPL-MCNC: 37 MG/DL (ref 7–18)
CALCIUM SERPL-MCNC: 7.7 MG/DL (ref 8.8–10.2)
CHLORIDE SERPL-SCNC: 108 MEQ/L (ref 98–107)
CO2 SERPL-SCNC: 24 MEQ/L (ref 21–32)
CREAT SERPL-MCNC: 3.85 MG/DL (ref 0.7–1.3)
GFR SERPL CREATININE-BSD FRML MDRD: 15.9 ML/MIN/{1.73_M2} (ref 35–?)
GLUCOSE SERPL-MCNC: 89 MG/DL (ref 70–100)
HCT VFR BLD AUTO: 29.8 % (ref 42–52)
HGB BLD-MCNC: 9.6 G/DL (ref 13.5–17.5)
MCH RBC QN AUTO: 31.4 PG (ref 27–33)
MCHC RBC AUTO-ENTMCNC: 32.2 G/DL (ref 32–36.5)
MCV RBC AUTO: 97.4 FL (ref 80–96)
PLATELET # BLD AUTO: 50 10^3/UL (ref 150–450)
POTASSIUM SERPL-SCNC: 4 MEQ/L (ref 3.5–5.1)
PROT SERPL-MCNC: 4.5 GM/DL (ref 6.4–8.2)
RBC # BLD AUTO: 3.06 10^6/UL (ref 4.3–6.1)
SODIUM SERPL-SCNC: 142 MEQ/L (ref 136–145)
WBC # BLD AUTO: 18.3 10^3/UL (ref 4–10)

## 2022-07-04 RX ADMIN — Medication SCH: at 15:36

## 2022-07-04 RX ADMIN — SEVELAMER CARBONATE SCH MG: 800 TABLET, FILM COATED ORAL at 18:00

## 2022-07-04 RX ADMIN — CEFTRIAXONE SCH MLS/HR: 1 INJECTION, POWDER, FOR SOLUTION INTRAMUSCULAR; INTRAVENOUS at 01:25

## 2022-07-04 RX ADMIN — DEXTROSE MONOHYDRATE SCH MG: 50 INJECTION, SOLUTION INTRAVENOUS at 22:40

## 2022-07-04 RX ADMIN — DEXTROSE MONOHYDRATE SCH MG: 50 INJECTION, SOLUTION INTRAVENOUS at 06:38

## 2022-07-04 RX ADMIN — DEXTROSE MONOHYDRATE SCH MG: 50 INJECTION, SOLUTION INTRAVENOUS at 14:13

## 2022-07-04 RX ADMIN — MULTIPLE VITAMINS W/ MINERALS TAB SCH TAB: TAB at 08:15

## 2022-07-04 RX ADMIN — SEVELAMER CARBONATE SCH MG: 800 TABLET, FILM COATED ORAL at 12:46

## 2022-07-04 RX ADMIN — SEVELAMER CARBONATE SCH MG: 800 TABLET, FILM COATED ORAL at 08:15

## 2022-07-04 RX ADMIN — PANTOPRAZOLE SODIUM SCH MG: 20 TABLET, DELAYED RELEASE ORAL at 08:15

## 2022-07-04 RX ADMIN — OMEGA-3 FATTY ACIDS CAP 1000 MG SCH CAP: 1000 CAP at 08:15

## 2022-07-05 VITALS — DIASTOLIC BLOOD PRESSURE: 45 MMHG | SYSTOLIC BLOOD PRESSURE: 81 MMHG

## 2022-07-05 RX ADMIN — CEFTRIAXONE SCH MLS/HR: 1 INJECTION, POWDER, FOR SOLUTION INTRAMUSCULAR; INTRAVENOUS at 00:28
